# Patient Record
Sex: MALE | Race: AMERICAN INDIAN OR ALASKA NATIVE
[De-identification: names, ages, dates, MRNs, and addresses within clinical notes are randomized per-mention and may not be internally consistent; named-entity substitution may affect disease eponyms.]

---

## 2018-09-24 ENCOUNTER — HOSPITAL ENCOUNTER (EMERGENCY)
Dept: HOSPITAL 31 - C.ER | Age: 28
Discharge: HOME | End: 2018-09-24
Payer: MEDICAID

## 2018-09-24 VITALS — DIASTOLIC BLOOD PRESSURE: 89 MMHG | HEART RATE: 75 BPM | RESPIRATION RATE: 16 BRPM | SYSTOLIC BLOOD PRESSURE: 147 MMHG

## 2018-09-24 VITALS — OXYGEN SATURATION: 99 %

## 2018-09-24 VITALS — TEMPERATURE: 99.3 F

## 2018-09-24 DIAGNOSIS — L73.9: Primary | ICD-10-CM

## 2018-09-24 NOTE — C.PDOC
History Of Present Illness


29 y/o male comes in for evaluation of a painful swelling to the right groin 

area gradually developing for the past few days. Admits to having similar 

symptoms in the past, resolved with time. Otherwise, patient denies fever, 

chills, sore throat, abd. pain, UTI symptoms, testicular pain/swelling, penile 

discharge, or rash. Ambulate to Ed for evaluation, not in any apparent distress.





Time Seen by Provider: 09/24/18 11:46


Chief Complaint (Nursing): Abnormal Skin Integrity


History Per: Patient


History/Exam Limitations: no limitations


Onset/Duration Of Symptoms: Days


Current Symptoms Are (Timing): Still Present





Past Medical History


Reviewed: Historical Data, Nursing Documentation, Vital Signs


Vital Signs: 


                                Last Vital Signs











Temp  99.3 F   09/24/18 11:48


 


Pulse  75   09/24/18 12:38


 


Resp  16   09/24/18 12:38


 


BP  147/89   09/24/18 12:38


 


Pulse Ox  99   09/24/18 13:44














- Medical History


Other PMH: Obese


Surgical History: No Surg Hx


Family History: States: No Known Family Hx





- Social History


Hx Alcohol Use: Yes


Hx Substance Use: No





- Immunization History


Hx Tetanus Toxoid Vaccination: No


Hx Influenza Vaccination: No


Hx Pneumococcal Vaccination: No





Review Of Systems


Except As Marked, All Systems Reviewed And Found Negative.


Constitutional: Negative for: Fever, Chills


ENT: Negative for: Throat Pain


Gastrointestinal: Negative for: Vomiting, Abdominal Pain


Genitourinary: Negative for: Dysuria, Frequency, Incontinence, Hematuria, Rash, 

Penile Pain


Skin: Positive for: Other (+ tender swelling to R groin)


Neurological: Negative for: Weakness, Numbness, Incoordination





Physical Exam





- Physical Exam


Appears: Well, Non-toxic, No Acute Distress


Skin: Normal Color, Warm, Other (Mildly tender mass approximately 1 cm diameter,

over the right scrotum area, with no fluctuance and no proximal streaking)


Oral Mucosa: Moist


Throat: No Erythema, No Drooling


Neck: Supple


Gastrointestinal/Abdominal: Bowel Sounds (normal), Soft, No Tenderness, No 

Distention, No Guarding


Back: No CVA Tenderness


Male Genital: No Testicular Swelling, No Scrotal Swelling


Extremity: Normal ROM, No Swelling


Neurological/Psych: Oriented x3, Normal Speech





ED Course And Treatment


O2 Sat by Pulse Oximetry: 99 (RA)


Pulse Ox Interpretation: Normal


Progress Note: On re-evaluation, pt is afebrile, hemodynamicaly stable.  Non-tox

ic.  Abd: benign, (-) guarding, (-) rebound.  : small tender Right scrotal 

folliculitis, (-) flactualnce, (-) proxmal streaking, (-) testicular edema or 

erythema..  Pt advised on course of ds.  ref. to f/u with PMD, Urology in 1 -2 

days for re-eval.  Return to Ed if any worsening or new changes.





Disposition


Counseled Patient/Family Regarding: Studies Performed, Diagnosis, Need For 

Followup, Rx Given





- Disposition


Referrals: 


[facility] [Outside]


Disposition: HOME/ ROUTINE


Disposition Time: 12:01


Condition: STABLE


Additional Instructions: 


Warm salty water compresses to area


Take medication as prescribed


Follow up with PMD in 2-3 days for re-evaluation.


Return to ED if any worsening or new changes


Prescriptions: 


Doxycycline Hyclate [Doryx] 100 mg PO BID #14 cap


Instructions:  Folliculitis (DC)


Forms:  [facility] (English)





- POA


Present On Arrival: None





- Clinical Impression


Clinical Impression: 


 Folliculitis








- PA / NP / Resident Statement


MD/DO has reviewed & agrees with the documentation as recorded.





- Scribe Statement


The provider has reviewed the documentation as recorded by the Scribe (Malathi Tomas)





All medical record entries made by the Scribe were at my direction and 

personally dictated by me. I have reviewed the chart and agree that the record 

accurately reflects my personal performance of the history, physical exam, 

medical decision making, and the department course for this patient. I have also

 personally directed, reviewed, and agree with the discharge instructions and 

disposition.